# Patient Record
Sex: FEMALE | URBAN - METROPOLITAN AREA
[De-identification: names, ages, dates, MRNs, and addresses within clinical notes are randomized per-mention and may not be internally consistent; named-entity substitution may affect disease eponyms.]

---

## 2023-08-31 ENCOUNTER — WEB ENCOUNTER (OUTPATIENT)
Dept: URBAN - METROPOLITAN AREA CLINIC 35 | Facility: CLINIC | Age: 38
End: 2023-08-31

## 2023-09-06 ENCOUNTER — LAB OUTSIDE AN ENCOUNTER (OUTPATIENT)
Dept: URBAN - METROPOLITAN AREA CLINIC 33 | Facility: CLINIC | Age: 38
End: 2023-09-06

## 2023-09-06 ENCOUNTER — OFFICE VISIT (OUTPATIENT)
Dept: URBAN - METROPOLITAN AREA CLINIC 33 | Facility: CLINIC | Age: 38
End: 2023-09-06
Payer: COMMERCIAL

## 2023-09-06 VITALS
DIASTOLIC BLOOD PRESSURE: 82 MMHG | SYSTOLIC BLOOD PRESSURE: 122 MMHG | WEIGHT: 173 LBS | HEIGHT: 66 IN | BODY MASS INDEX: 27.8 KG/M2

## 2023-09-06 DIAGNOSIS — R11.0 NAUSEA: ICD-10-CM

## 2023-09-06 DIAGNOSIS — R14.2 BELCHING: ICD-10-CM

## 2023-09-06 DIAGNOSIS — R10.11 RIGHT UPPER QUADRANT PAIN: ICD-10-CM

## 2023-09-06 DIAGNOSIS — R10.13 EPIGASTRIC PAIN: ICD-10-CM

## 2023-09-06 DIAGNOSIS — R14.0 ABDOMINAL BLOATING: ICD-10-CM

## 2023-09-06 PROCEDURE — 99204 OFFICE O/P NEW MOD 45 MIN: CPT | Performed by: PHYSICIAN ASSISTANT

## 2023-09-06 RX ORDER — METOPROLOL TARTRATE 25 MG/1
1 TABLET WITH FOOD TABLET, FILM COATED ORAL TWICE A DAY
Status: DISCONTINUED | COMMUNITY

## 2023-09-06 RX ORDER — SERTRALINE 25 MG/1
1 TABLET TABLET, FILM COATED ORAL ONCE A DAY
Status: ACTIVE | COMMUNITY

## 2023-09-06 RX ORDER — ETONOGESTREL AND ETHINYL ESTRADIOL .12; .015 MG/D; MG/D
1 RING LEAVE IN PLACE FOR 3 WEEKS, REMOVE, AND REPLACE WITH A NEW RING AFTER 7 DAY BREAK INSERT, EXTENDED RELEASE VAGINAL
Status: ACTIVE | COMMUNITY

## 2023-09-06 RX ORDER — TIRZEPATIDE 12.5 MG/.5ML
AS DIRECTED INJECTION, SOLUTION SUBCUTANEOUS
Status: ACTIVE | COMMUNITY

## 2023-09-06 NOTE — PHYSICAL EXAM GASTROINTESTINAL
Abdomen, soft, RUQ tender, nondistended, normal bowel sounds, Liver and Spleen, no hepatomegaly present

## 2023-09-06 NOTE — PHYSICAL EXAM NECK/THYROID:
See scanned exercise session detailed report normal appearance, no deformities, trachea midline, thyroid nontender

## 2023-09-12 ENCOUNTER — WEB ENCOUNTER (OUTPATIENT)
Dept: URBAN - METROPOLITAN AREA SURGERY CENTER 8 | Facility: SURGERY CENTER | Age: 38
End: 2023-09-12

## 2023-09-15 ENCOUNTER — OFFICE VISIT (OUTPATIENT)
Dept: URBAN - METROPOLITAN AREA SURGERY CENTER 8 | Facility: SURGERY CENTER | Age: 38
End: 2023-09-15
Payer: COMMERCIAL

## 2023-09-15 ENCOUNTER — CLAIMS CREATED FROM THE CLAIM WINDOW (OUTPATIENT)
Dept: URBAN - METROPOLITAN AREA CLINIC 4 | Facility: CLINIC | Age: 38
End: 2023-09-15
Payer: COMMERCIAL

## 2023-09-15 DIAGNOSIS — R11.0 AM NAUSEA: ICD-10-CM

## 2023-09-15 DIAGNOSIS — K31.89 ACHYLIA: ICD-10-CM

## 2023-09-15 DIAGNOSIS — K31.89 OTHER DISEASES OF STOMACH AND DUODENUM: ICD-10-CM

## 2023-09-15 DIAGNOSIS — K29.70 GASTRITIS, UNSPECIFIED, WITHOUT BLEEDING: ICD-10-CM

## 2023-09-15 DIAGNOSIS — K29.70 ERYTHEMATOUS GASTROPATHY: ICD-10-CM

## 2023-09-15 DIAGNOSIS — K44.9 HIATAL HERNIA: ICD-10-CM

## 2023-09-15 PROCEDURE — 00731 ANES UPR GI NDSC PX NOS: CPT | Performed by: NURSE ANESTHETIST, CERTIFIED REGISTERED

## 2023-09-15 PROCEDURE — G8907 PT DOC NO EVENTS ON DISCHARG: HCPCS | Performed by: INTERNAL MEDICINE

## 2023-09-15 PROCEDURE — 88312 SPECIAL STAINS GROUP 1: CPT | Performed by: PATHOLOGY

## 2023-09-15 PROCEDURE — 43239 EGD BIOPSY SINGLE/MULTIPLE: CPT | Performed by: INTERNAL MEDICINE

## 2023-09-15 PROCEDURE — 88305 TISSUE EXAM BY PATHOLOGIST: CPT | Performed by: PATHOLOGY

## 2023-09-28 ENCOUNTER — WEB ENCOUNTER (OUTPATIENT)
Dept: URBAN - METROPOLITAN AREA CLINIC 33 | Facility: CLINIC | Age: 38
End: 2023-09-28

## 2023-10-04 ENCOUNTER — DASHBOARD ENCOUNTERS (OUTPATIENT)
Age: 38
End: 2023-10-04

## 2023-10-04 ENCOUNTER — OFFICE VISIT (OUTPATIENT)
Dept: URBAN - METROPOLITAN AREA CLINIC 33 | Facility: CLINIC | Age: 38
End: 2023-10-04
Payer: COMMERCIAL

## 2023-10-04 ENCOUNTER — TELEPHONE ENCOUNTER (OUTPATIENT)
Dept: URBAN - METROPOLITAN AREA CLINIC 35 | Facility: CLINIC | Age: 38
End: 2023-10-04

## 2023-10-04 VITALS
HEART RATE: 94 BPM | WEIGHT: 169.4 LBS | HEIGHT: 66 IN | BODY MASS INDEX: 27.23 KG/M2 | OXYGEN SATURATION: 99 % | SYSTOLIC BLOOD PRESSURE: 128 MMHG | DIASTOLIC BLOOD PRESSURE: 82 MMHG

## 2023-10-04 DIAGNOSIS — R11.0 NAUSEA: ICD-10-CM

## 2023-10-04 DIAGNOSIS — K44.9 HIATAL HERNIA: ICD-10-CM

## 2023-10-04 DIAGNOSIS — K29.30 CHRONIC SUPERFICIAL GASTRITIS WITHOUT BLEEDING: ICD-10-CM

## 2023-10-04 DIAGNOSIS — R14.2 BELCHING: ICD-10-CM

## 2023-10-04 DIAGNOSIS — R14.0 ABDOMINAL BLOATING: ICD-10-CM

## 2023-10-04 DIAGNOSIS — R10.13 EPIGASTRIC PAIN: ICD-10-CM

## 2023-10-04 DIAGNOSIS — R10.11 RIGHT UPPER QUADRANT PAIN: ICD-10-CM

## 2023-10-04 PROBLEM — 196735001: Status: ACTIVE | Noted: 2023-10-04

## 2023-10-04 PROCEDURE — 99214 OFFICE O/P EST MOD 30 MIN: CPT | Performed by: PHYSICIAN ASSISTANT

## 2023-10-04 RX ORDER — FAMOTIDINE 20 MG/1
1 TABLET TABLET, FILM COATED ORAL
Qty: 60 TABLET | Refills: 5 | OUTPATIENT
Start: 2023-10-04

## 2023-10-04 RX ORDER — SERTRALINE 25 MG/1
1 TABLET TABLET, FILM COATED ORAL ONCE A DAY
Status: ACTIVE | COMMUNITY

## 2023-10-04 RX ORDER — ETONOGESTREL AND ETHINYL ESTRADIOL .12; .015 MG/D; MG/D
1 RING LEAVE IN PLACE FOR 3 WEEKS, REMOVE, AND REPLACE WITH A NEW RING AFTER 7 DAY BREAK INSERT, EXTENDED RELEASE VAGINAL
Status: ACTIVE | COMMUNITY

## 2023-10-04 RX ORDER — DICYCLOMINE HYDROCHLORIDE 20 MG/1
1 TABLET TABLET ORAL
Qty: 30 | Refills: 1 | OUTPATIENT
Start: 2023-10-04 | End: 2023-12-02

## 2023-10-04 RX ORDER — TIRZEPATIDE 12.5 MG/.5ML
AS DIRECTED INJECTION, SOLUTION SUBCUTANEOUS
Status: ACTIVE | COMMUNITY

## 2023-10-04 NOTE — HPI-ABDOMINAL PAIN
Admits continued episodes of epigastric pain since her last visit.  She is doing Pepcid 20mg BID and that seems to help a small amount with her pain.  She states her pain is still present though.   She will wake up with the pain and sometimes can be so severe she doesn't want to eat. Dneies early satiety.  Normal US abd, CT abd/pelvis, and normal HIDA.  HIDA scan shows: 1. Quantitative hepatobiliary cholescintigraphy demonstrating normal gallbladder filling and normal billicary-to-bowel transit with normal gallbladder emptying.   Last visit: 38 year old female patient presents today for abdominal pain. Pain is located in the epigastric region and on the right side under her rib cage. She describes the pain as a gnawing/tender sensation. Onset was in July.  She states the pain was dull and felt like a pressure.   She had a visit to the ED for heart palpitations and was told it was likely GI-related. She does not know any triggering factors.  She thinks maybe a heating pad might help alleviate the pain.  She tried altering her diet, without relief.  Pain occurs daily and can last all day.  She tried Omeprazole 20mg without relief.  She had heartburn after her hospital visit.  Admits associated bloating and belching.  Admits to some nausea, but is on a weight loss medication that causes it, no vomiting. Admits she has recently had a CT of the abdomen and she was seen by another GI doctor in August, and she didn't have a good experience.  She has an EGD scheduled at that practice.  Denies any aggravating factors. Denies any alleviating factors.  She lost 65 pounds intentionally over the past year.  Pt had an US abdomen in July that she reports was normal.  CT Abdomen report shows: No acute pathology within the abdomen and pelvis is demonstrated.

## 2023-10-04 NOTE — HPI-ENDOSCOPY (EGD) FOLLOWUP
38 year old female patient presents today for a follow up from her endoscopy. Denies any complications post procedure.  EGD report shows: - Normal hypopharynx. - Small hiatal hernia. - Erythematous mucosa in the stomach.   - Normal examined duodenum. Stomach, Antrum, CF, Biopsy: Chemical/Reactive gastropathy.